# Patient Record
Sex: FEMALE | URBAN - NONMETROPOLITAN AREA
[De-identification: names, ages, dates, MRNs, and addresses within clinical notes are randomized per-mention and may not be internally consistent; named-entity substitution may affect disease eponyms.]

---

## 2019-01-23 ENCOUNTER — OFFICE VISIT (OUTPATIENT)
Dept: URBAN - NONMETROPOLITAN AREA CLINIC 18 | Facility: CLINIC | Age: 84
End: 2019-01-23
Payer: MEDICARE

## 2019-01-23 DIAGNOSIS — H43.391 OTHER VITREOUS OPACITIES, RIGHT EYE: ICD-10-CM

## 2019-01-23 DIAGNOSIS — H25.13 AGE-RELATED NUCLEAR CATARACT, BILATERAL: ICD-10-CM

## 2019-01-23 DIAGNOSIS — H02.834 DERMATOCHALASIS OF LEFT UPPER EYELID: ICD-10-CM

## 2019-01-23 DIAGNOSIS — H02.831 DERMATOCHALASIS OF RIGHT UPPER EYELID: ICD-10-CM

## 2019-01-23 DIAGNOSIS — G43.B0 OPHTHALMOPLEGIC MIGRAINE, NOT INTRACTABLE: Primary | ICD-10-CM

## 2019-01-23 PROCEDURE — 99204 OFFICE O/P NEW MOD 45 MIN: CPT | Performed by: OPTOMETRIST

## 2019-01-23 PROCEDURE — 92133 CPTRZD OPH DX IMG PST SGM ON: CPT | Performed by: OPTOMETRIST

## 2019-01-23 ASSESSMENT — VISUAL ACUITY
OD: 20/40
OS: 20/60

## 2019-01-23 ASSESSMENT — INTRAOCULAR PRESSURE
OD: 16
OS: 16

## 2019-01-23 NOTE — IMPRESSION/PLAN
Impression: Ophthalmoplegic migraine, not intractable: G43. B0. Plan: Discussed in detail with patient. All signs/symptoms and risks of retinal detachment and tears were discussed in detail. Patient instructed to call office immediately if any symptoms noted. 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 Letter to PCP, monitor in 1 month at glaucoma workup.

## 2019-01-23 NOTE — IMPRESSION/PLAN
Impression: Open angle with borderline findings, high risk, bilateral: H40.023. Plan: Discussed status and low tension glaucoma concern. Baseline OCT ON, inf wedge loss OD, vertical thin OS. RTC 1 month for VF 24-2, pachy and IOP check OU.

## 2019-01-23 NOTE — IMPRESSION/PLAN
Impression: Age-related nuclear cataract, bilateral: H25.13. Plan: Reviewed status of cataract with patient and potential effect on visual potential. No treatment at this time. No symptoms. Patient will monitor for any decrease in vision.

## 2019-01-23 NOTE — IMPRESSION/PLAN
Impression: Other vitreous opacities, right eye: H43.391. Plan: All signs/symptoms and risks of retinal detachment and tears were discussed in detail. Patient instructed to call office immediately if any symptoms noted.

## 2019-06-28 ENCOUNTER — OFFICE VISIT (OUTPATIENT)
Dept: URBAN - NONMETROPOLITAN AREA CLINIC 18 | Facility: CLINIC | Age: 84
End: 2019-06-28
Payer: MEDICARE

## 2019-06-28 DIAGNOSIS — H40.023 OPEN ANGLE WITH BORDERLINE FINDINGS, HIGH RISK, BILATERAL: ICD-10-CM

## 2019-06-28 PROCEDURE — 76514 ECHO EXAM OF EYE THICKNESS: CPT | Performed by: OPTOMETRIST

## 2019-06-28 PROCEDURE — 92133 CPTRZD OPH DX IMG PST SGM ON: CPT | Performed by: OPTOMETRIST

## 2019-06-28 PROCEDURE — 99214 OFFICE O/P EST MOD 30 MIN: CPT | Performed by: OPTOMETRIST

## 2019-06-28 PROCEDURE — 92083 EXTENDED VISUAL FIELD XM: CPT | Performed by: OPTOMETRIST

## 2019-06-28 ASSESSMENT — INTRAOCULAR PRESSURE
OD: 17
OS: 17

## 2019-06-28 ASSESSMENT — VISUAL ACUITY
OD: 20/50
OS: 20/70

## 2019-06-28 NOTE — IMPRESSION/PLAN
Impression: Open angle with borderline findings, high risk, bilateral: H40.023. Plan: Discussed status and low tension glaucoma concern. OCT ON today: thin inf OD with questionable progression, vertical thin OS questionable progression. Baseline VF: diffuse loss, greatest loss superior, questionable pattern OD, diffuse loss, greatest nasal. Pachy: thin OU. RTC 3 weeks and discuss glaucoma treatment options with Dr. Aziza Franklin. Consider combined procedure (MIGS) options.

## 2019-06-28 NOTE — IMPRESSION/PLAN
Impression: Age-related nuclear cataract, bilateral: H25.13. Plan: Cataracts account for patient symptoms of decreased visual clarity. Reviewed risk and potential benefits of surgery. Recommend cataract consultation with surgeon. Prognosis outcome guarded secondary to optic nerve status.

## 2019-10-03 ENCOUNTER — OFFICE VISIT (OUTPATIENT)
Dept: URBAN - NONMETROPOLITAN AREA CLINIC 18 | Facility: CLINIC | Age: 84
End: 2019-10-03
Payer: MEDICARE

## 2019-10-03 DIAGNOSIS — H40.1132 PRIMARY OPEN-ANGLE GLAUCOMA, BILATERAL, MODERATE STAGE: ICD-10-CM

## 2019-10-03 DIAGNOSIS — H25.813 COMBINED FORMS OF AGE-RELATED CATARACT, BILATERAL: Primary | ICD-10-CM

## 2019-10-03 PROCEDURE — 92136 OPHTHALMIC BIOMETRY: CPT | Performed by: OPHTHALMOLOGY

## 2019-10-03 PROCEDURE — 99204 OFFICE O/P NEW MOD 45 MIN: CPT | Performed by: OPHTHALMOLOGY

## 2019-10-03 RX ORDER — GATIFLOXACIN 5 MG/ML
0.5 % SOLUTION/ DROPS OPHTHALMIC
Qty: 1 | Refills: 1 | Status: INACTIVE
Start: 2019-10-03 | End: 2020-04-21

## 2019-10-03 RX ORDER — DICLOFENAC SODIUM 1 MG/ML
0.1 % SOLUTION/ DROPS OPHTHALMIC
Qty: 1 | Refills: 1 | Status: INACTIVE
Start: 2019-10-03 | End: 2020-04-21

## 2019-10-03 RX ORDER — LATANOPROST 50 UG/ML
0.005 % SOLUTION OPHTHALMIC
Qty: 1 | Refills: 3 | Status: INACTIVE
Start: 2019-10-03 | End: 2020-04-21

## 2019-10-03 RX ORDER — PREDNISOLONE ACETATE 10 MG/ML
1 % SUSPENSION/ DROPS OPHTHALMIC
Qty: 1 | Refills: 1 | Status: INACTIVE
Start: 2019-10-03 | End: 2020-04-21

## 2019-10-03 ASSESSMENT — INTRAOCULAR PRESSURE
OD: 17
OS: 17

## 2019-10-03 ASSESSMENT — VISUAL ACUITY
OS: 20/70
OD: 20/50

## 2019-10-03 ASSESSMENT — KERATOMETRY
OS: 46.38
OD: 46.75

## 2019-10-03 NOTE — IMPRESSION/PLAN
Impression: Primary open-angle glaucoma, bilateral, moderate stage: T29.1900. Plan: OCT and Pach reviewed. Rec. starting Latanoprost QHS OU.  VF and IOP check to determine need to continue drops.

## 2019-10-03 NOTE — IMPRESSION/PLAN
Impression: Combined forms of age-related cataract, bilateral: H25.813. Plan: Cataract accounts for pt complaints. Pt desires sx. Schedule CE/IOL with MIGS OS only, may need OD in the future. Risk/Benefits/Alternatives discussed with patient. Rec. mono-focal. RL2. Target distance. Generic drops. Rec. iStent Inject vs. Goniotomy with Mayela Adrian for better IOP control and to prevent progression. Discussed possible need additional drops or surgery in the future. Rec. intra-ocular Moxifloxacin to decrease the risk of endophthalmitis.

## 2019-10-24 ENCOUNTER — POST-OPERATIVE VISIT (OUTPATIENT)
Dept: URBAN - METROPOLITAN AREA CLINIC 88 | Facility: CLINIC | Age: 84
End: 2019-10-24

## 2019-10-24 ENCOUNTER — Encounter (OUTPATIENT)
Dept: URBAN - METROPOLITAN AREA EXTERNAL CLINIC 48 | Facility: EXTERNAL CLINIC | Age: 84
End: 2019-10-24
Payer: MEDICARE

## 2019-10-24 PROCEDURE — 66984 XCAPSL CTRC RMVL W/O ECP: CPT | Performed by: OPHTHALMOLOGY

## 2019-10-24 PROCEDURE — 99024 POSTOP FOLLOW-UP VISIT: CPT | Performed by: OPTOMETRIST

## 2019-10-24 ASSESSMENT — INTRAOCULAR PRESSURE
OS: 16
OS: 16

## 2019-10-30 ENCOUNTER — POST-OPERATIVE VISIT (OUTPATIENT)
Dept: URBAN - NONMETROPOLITAN AREA CLINIC 18 | Facility: CLINIC | Age: 84
End: 2019-10-30

## 2019-10-30 DIAGNOSIS — Z09 ENCNTR FOR F/U EXAM AFT TRTMT FOR COND OTH THAN MALIG NEOPLM: Primary | ICD-10-CM

## 2019-10-30 PROCEDURE — 99024 POSTOP FOLLOW-UP VISIT: CPT | Performed by: OPTOMETRIST

## 2019-10-30 ASSESSMENT — VISUAL ACUITY: OS: 20/20-2

## 2019-10-30 ASSESSMENT — INTRAOCULAR PRESSURE
OS: 19
OS: 18
OD: 19

## 2019-11-20 ENCOUNTER — POST-OPERATIVE VISIT (OUTPATIENT)
Dept: URBAN - NONMETROPOLITAN AREA CLINIC 18 | Facility: CLINIC | Age: 84
End: 2019-11-20

## 2019-11-20 ASSESSMENT — INTRAOCULAR PRESSURE
OD: 14
OS: 14

## 2019-11-20 ASSESSMENT — VISUAL ACUITY
OD: 20/25+
OS: 20/20

## 2020-02-05 ENCOUNTER — OFFICE VISIT (OUTPATIENT)
Dept: URBAN - NONMETROPOLITAN AREA CLINIC 18 | Facility: CLINIC | Age: 85
End: 2020-02-05
Payer: MEDICARE

## 2020-02-05 DIAGNOSIS — H25.811 COMBINED FORMS OF AGE-RELATED CATARACT, RIGHT EYE: ICD-10-CM

## 2020-02-05 PROCEDURE — 99214 OFFICE O/P EST MOD 30 MIN: CPT | Performed by: OPTOMETRIST

## 2020-02-05 PROCEDURE — 92133 CPTRZD OPH DX IMG PST SGM ON: CPT | Performed by: OPTOMETRIST

## 2020-02-05 ASSESSMENT — VISUAL ACUITY
OD: 20/40
OS: 20/20

## 2020-02-05 ASSESSMENT — INTRAOCULAR PRESSURE
OS: 14
OD: 14

## 2020-02-05 NOTE — IMPRESSION/PLAN
Impression: Punctate keratitis, bilateral: H16.143. Plan: Discussed condition with patient in detail. Start Cequa BID OU, sample provided. Recommend treatment with Preservative Free Artificial tears QID (Systane or Refresh Brand recommended). RTC if symptoms continue.

## 2020-02-05 NOTE — IMPRESSION/PLAN
Impression: Primary open-angle glaucoma, bilateral, moderate stage: U92.5974. Plan: Discussed status and low tension glaucoma concern. OCT ON today: vertical thin stable OU. Prior VF: diffuse loss, greatest loss superior, questionable pattern OD, diffuse loss, greatest nasal. Pachy: thin OU. Continue Latanoprost QHS OU. Starting IOP 19, target set at 14, met today. RTC 1 month for IOP check and VF 24-2.

## 2020-02-05 NOTE — IMPRESSION/PLAN
Impression: Combined forms of age-related cataract, right eye: H25.811. Plan: Reviewed status of cataract with patient and potential effect on vision. Patient not experiencing a decrease in quality of life from cataracts. No treatment at this time. Patient will monitor for any decrease in vision and notify clinic if new symptoms experienced.

## 2020-03-06 ENCOUNTER — OFFICE VISIT (OUTPATIENT)
Dept: URBAN - NONMETROPOLITAN AREA CLINIC 18 | Facility: CLINIC | Age: 85
End: 2020-03-06
Payer: MEDICARE

## 2020-03-06 DIAGNOSIS — H52.4 PRESBYOPIA: ICD-10-CM

## 2020-03-06 PROCEDURE — 99213 OFFICE O/P EST LOW 20 MIN: CPT | Performed by: OPTOMETRIST

## 2020-03-06 PROCEDURE — 92083 EXTENDED VISUAL FIELD XM: CPT | Performed by: OPTOMETRIST

## 2020-03-06 ASSESSMENT — INTRAOCULAR PRESSURE
OS: 10
OD: 12

## 2020-03-06 ASSESSMENT — VISUAL ACUITY
OD: 20/30
OS: 20/25

## 2020-03-06 NOTE — IMPRESSION/PLAN
Impression: Primary open-angle glaucoma, bilateral, severe stage: E10.4159. Plan: Discussed status and low tension glaucoma concern. VF 24-2 today, sup arcuate lid? inf nasal step OD, castano arcuate paracentral inf nasal step OS. Last OCT ON, vertical thin stable OU. Pachy: thin OU. Continue Latanoprost QHS OU. Starting IOP 19, target set at 14, met today. Tolerating drops well and IOP controlled. RTC 3 months for IOP check.  Repeat VF in 6 months

## 2020-06-09 ENCOUNTER — OFFICE VISIT (OUTPATIENT)
Dept: URBAN - NONMETROPOLITAN AREA CLINIC 18 | Facility: CLINIC | Age: 85
End: 2020-06-09
Payer: MEDICARE

## 2020-06-09 DIAGNOSIS — H40.1133 PRIMARY OPEN-ANGLE GLAUCOMA, BILATERAL, SEVERE STAGE: Primary | ICD-10-CM

## 2020-06-09 PROCEDURE — 99213 OFFICE O/P EST LOW 20 MIN: CPT | Performed by: OPTOMETRIST

## 2020-06-09 ASSESSMENT — INTRAOCULAR PRESSURE
OD: 11
OS: 11

## 2020-06-09 NOTE — IMPRESSION/PLAN
Impression: Primary open-angle glaucoma, bilateral, severe stage: G30.0768. Plan: Discussed status and low tension glaucoma concern. Continue Latanoprost QHS OU. Starting IOP 19, target set at 14, met today. Tolerating drops well and IOP controlled.   RTC 3 months for IOP check and VF

## 2020-09-09 ENCOUNTER — OFFICE VISIT (OUTPATIENT)
Dept: URBAN - NONMETROPOLITAN AREA CLINIC 18 | Facility: CLINIC | Age: 85
End: 2020-09-09
Payer: MEDICARE

## 2020-09-09 DIAGNOSIS — H02.052 TRICHIASIS WITHOUT ENTROPION RIGHT LOWER EYELID: ICD-10-CM

## 2020-09-09 DIAGNOSIS — H01.8 OTHER SPECIFIED INFLAMMATIONS OF EYELID: ICD-10-CM

## 2020-09-09 DIAGNOSIS — Z96.1 PRESENCE OF PSEUDOPHAKIA: ICD-10-CM

## 2020-09-09 DIAGNOSIS — H16.143 PUNCTATE KERATITIS, BILATERAL: ICD-10-CM

## 2020-09-09 PROCEDURE — 92083 EXTENDED VISUAL FIELD XM: CPT | Performed by: OPTOMETRIST

## 2020-09-09 PROCEDURE — 99213 OFFICE O/P EST LOW 20 MIN: CPT | Performed by: OPTOMETRIST

## 2020-09-09 ASSESSMENT — INTRAOCULAR PRESSURE
OD: 10
OS: 11

## 2020-09-09 NOTE — IMPRESSION/PLAN
Impression: Punctate keratitis, bilateral: H16.143. Plan: Discussed status. ATs for comfort. Pataday Qam to reduce itching symptoms. RTC if any additional concerns experienced.

## 2020-09-09 NOTE — ASSESSMENT/PLAN
Impression: Visual Field - OD: Fair-scattered loss; OS: Poor-nasal loss, improved Plan: Monitor. IOP controlled and no progression. Poor testing ability.

## 2021-05-26 ENCOUNTER — OFFICE VISIT (OUTPATIENT)
Dept: URBAN - NONMETROPOLITAN AREA CLINIC 18 | Facility: CLINIC | Age: 86
End: 2021-05-26
Payer: MEDICARE

## 2021-05-26 DIAGNOSIS — H04.123 DRY EYE SYNDROME OF BILATERAL LACRIMAL GLANDS: ICD-10-CM

## 2021-05-26 PROCEDURE — 99214 OFFICE O/P EST MOD 30 MIN: CPT | Performed by: OPTOMETRIST

## 2021-05-26 PROCEDURE — 92133 CPTRZD OPH DX IMG PST SGM ON: CPT | Performed by: OPTOMETRIST

## 2021-05-26 ASSESSMENT — INTRAOCULAR PRESSURE
OD: 14
OS: 11
OD: 15
OS: 12

## 2021-05-26 NOTE — IMPRESSION/PLAN
Impression: Primary open-angle glaucoma, bilateral, severe stage: I94.4210. Plan: Discussed status and low tension glaucoma concern. Continue Latanoprost QHS OU. Starting IOP 19, target set at 14, met today. Tolerating drops well and IOP controlled. Questionable progression OS. Additional treatment with secondary drop offered with risk reviewed, patient deferred at this time. Referral 2 months for Cataract/MIGS evaluation with Dr. Elie Moseley in [facility].

## 2021-05-26 NOTE — IMPRESSION/PLAN
Impression: Age-related nuclear cataract, right eye: H25.11. Plan: Cataracts account for patient symptoms of decreased visual clarity. Reviewed risk and potential benefits of surgery. Recommend cataract consultation with surgeon.

## 2021-09-02 ENCOUNTER — OFFICE VISIT (OUTPATIENT)
Dept: URBAN - METROPOLITAN AREA CLINIC 88 | Facility: CLINIC | Age: 86
End: 2021-09-02
Payer: MEDICARE

## 2021-09-02 PROCEDURE — 99213 OFFICE O/P EST LOW 20 MIN: CPT | Performed by: OPHTHALMOLOGY

## 2021-09-02 ASSESSMENT — INTRAOCULAR PRESSURE
OD: 13
OS: 12

## 2021-09-02 ASSESSMENT — KERATOMETRY
OS: 46.00
OD: 46.63

## 2021-09-02 ASSESSMENT — VISUAL ACUITY
OD: 20/40
OS: 20/25

## 2021-09-02 NOTE — IMPRESSION/PLAN
Impression: Combined forms of age-related cataract, right eye: H25.811. Plan: Cataracts account for the patient's complaints. Discussed all risks, benefits, procedures and recovery. Patient understands changing glasses will not improve vision. Patient desires to have surgery, recommend phacoemulsification with intraocular lens. Surgical risks and benefits were discussed, explained and to patient. RL2 Schedule Cataract sx OD. Final post operative refractive decision will be made by Operative Surgeon. Pred-Moxi-Nepafenac/generic drops/dexycu Post op care in [place]

## 2021-09-02 NOTE — IMPRESSION/PLAN
Impression: Primary open-angle glaucoma, bilateral, severe stage: V07.9362. Plan: Intraocular pressure well controlled, tolerating medications.  Continue with Latanoprost QHS OU

## 2021-09-15 ENCOUNTER — TESTING ONLY (OUTPATIENT)
Dept: URBAN - NONMETROPOLITAN AREA CLINIC 18 | Facility: CLINIC | Age: 86
End: 2021-09-15
Payer: MEDICARE

## 2021-09-15 DIAGNOSIS — H25.11 AGE-RELATED NUCLEAR CATARACT, RIGHT EYE: Primary | ICD-10-CM

## 2021-09-15 PROCEDURE — 76519 ECHO EXAM OF EYE: CPT | Performed by: OPHTHALMOLOGY

## 2021-10-08 ENCOUNTER — POST-OPERATIVE VISIT (OUTPATIENT)
Dept: URBAN - METROPOLITAN AREA CLINIC 89 | Facility: CLINIC | Age: 86
End: 2021-10-08
Payer: MEDICARE

## 2021-10-08 ENCOUNTER — SURGERY (OUTPATIENT)
Dept: URBAN - METROPOLITAN AREA SURGERY 56 | Facility: SURGERY | Age: 86
End: 2021-10-08
Payer: MEDICARE

## 2021-10-08 PROCEDURE — G8918 PT W/O PREOP ORDER IV AB PRO: HCPCS | Performed by: CLINIC/CENTER

## 2021-10-08 PROCEDURE — 99024 POSTOP FOLLOW-UP VISIT: CPT

## 2021-10-08 PROCEDURE — G8907 PT DOC NO EVENTS ON DISCHARG: HCPCS | Performed by: CLINIC/CENTER

## 2021-10-08 ASSESSMENT — INTRAOCULAR PRESSURE
OD: 6
OS: 9
OS: 9
OD: 6

## 2021-10-08 NOTE — IMPRESSION/PLAN
Impression:  Presence of intraocular lens  Z96.1. Plan: S/P Cataract extraction right eye with placement of intraocular Dexycu
and moxifloxacin intracamerally - post-op day #0 - Advised patient that likely will
see floaters for 1-2 weeks. Advised no heavy lifting or strenuous activity for at
least one week and no swimming for at least three weeks. Use eye shield all day
today and then at night for one week. Patient advised to call immediately for any
problems including, but not limited to, pain, redness, increase in floaters, flashing
lights, changes in peripheral vision, decreased vision, and/or any other problems
or concerns. Patient stated an understanding of all the instructions. Follow-up in
approximately one week / prn.

## 2021-10-15 ENCOUNTER — POST-OPERATIVE VISIT (OUTPATIENT)
Dept: URBAN - NONMETROPOLITAN AREA CLINIC 18 | Facility: CLINIC | Age: 86
End: 2021-10-15
Payer: MEDICARE

## 2021-10-15 PROCEDURE — 99024 POSTOP FOLLOW-UP VISIT: CPT | Performed by: OPTOMETRIST

## 2021-10-15 ASSESSMENT — INTRAOCULAR PRESSURE
OS: 14
OD: 14

## 2021-10-15 ASSESSMENT — VISUAL ACUITY
OS: 20/30-2
OD: 20/40

## 2021-10-15 NOTE — IMPRESSION/PLAN
Impression: S/P Cataract Extraction by phacoemulsification with IOL placement OD - 7 Days. Presence of intraocular lens  Z96.1. Post operative instructions reviewed -
OCT macula today, AMD, -CME, -SRNM OU Plan: RTC if pain, redness or changes in vision experienced. --Advised patient to use artificial tears for comfort.

## 2021-11-03 ENCOUNTER — POST-OPERATIVE VISIT (OUTPATIENT)
Dept: URBAN - NONMETROPOLITAN AREA CLINIC 18 | Facility: CLINIC | Age: 86
End: 2021-11-03
Payer: MEDICARE

## 2021-11-03 PROCEDURE — 99024 POSTOP FOLLOW-UP VISIT: CPT | Performed by: OPTOMETRIST

## 2021-11-03 ASSESSMENT — VISUAL ACUITY
OS: 20/25
OD: 20/40

## 2021-11-03 ASSESSMENT — INTRAOCULAR PRESSURE
OD: 11
OS: 11

## 2021-11-03 NOTE — IMPRESSION/PLAN
Impression: S/P Cataract Extraction by phacoemulsification with IOL placement OD - 26 Days. Presence of intraocular lens  Z96.1. Post operative instructions reviewed - Plan: RTC if pain, redness or changes in vision experienced. --Advised patient to use artificial tears for comfort. Continue Latanoprost QHS OU.

## 2022-02-04 ENCOUNTER — OFFICE VISIT (OUTPATIENT)
Dept: URBAN - NONMETROPOLITAN AREA CLINIC 18 | Facility: CLINIC | Age: 87
End: 2022-02-04
Payer: MEDICARE

## 2022-02-04 DIAGNOSIS — H43.813 VITREOUS DEGENERATION, BILATERAL: ICD-10-CM

## 2022-02-04 DIAGNOSIS — H26.491 OTHER SECONDARY CATARACT, RIGHT EYE: ICD-10-CM

## 2022-02-04 DIAGNOSIS — H10.413 CONJUNCTIVITIS - ALLERGIC: ICD-10-CM

## 2022-02-04 PROCEDURE — 99213 OFFICE O/P EST LOW 20 MIN: CPT | Performed by: OPTOMETRIST

## 2022-02-04 PROCEDURE — 92133 CPTRZD OPH DX IMG PST SGM ON: CPT | Performed by: OPTOMETRIST

## 2022-02-04 ASSESSMENT — INTRAOCULAR PRESSURE
OD: 11
OS: 11

## 2022-02-04 NOTE — IMPRESSION/PLAN
Impression: Primary open-angle glaucoma, bilateral, severe stage: Z75.2782. Plan: Discussed status and low tension glaucoma concern. OCT ON today, stable vertical thin OU. Continue Latanoprost QHS OU. Starting IOP 19, target set at 14, met today. Tolerating drops well and IOP controlled. Will monitor in 4 months with VF 24-2 and IOP check.

## 2022-06-10 ENCOUNTER — OFFICE VISIT (OUTPATIENT)
Dept: URBAN - NONMETROPOLITAN AREA CLINIC 18 | Facility: CLINIC | Age: 87
End: 2022-06-10
Payer: MEDICARE

## 2022-06-10 DIAGNOSIS — Z96.1 PRESENCE OF PSEUDOPHAKIA: ICD-10-CM

## 2022-06-10 DIAGNOSIS — H26.491 OTHER SECONDARY CATARACT, RIGHT EYE: ICD-10-CM

## 2022-06-10 DIAGNOSIS — H40.1133 PRIMARY OPEN-ANGLE GLAUCOMA, BILATERAL, SEVERE STAGE: Primary | ICD-10-CM

## 2022-06-10 DIAGNOSIS — H10.413 CONJUNCTIVITIS - ALLERGIC: ICD-10-CM

## 2022-06-10 PROCEDURE — 99214 OFFICE O/P EST MOD 30 MIN: CPT | Performed by: OPTOMETRIST

## 2022-06-10 PROCEDURE — 92083 EXTENDED VISUAL FIELD XM: CPT | Performed by: OPTOMETRIST

## 2022-06-10 RX ORDER — BRIMONIDINE TARTRATE 2 MG/ML
0.2 % SOLUTION/ DROPS OPHTHALMIC
Qty: 10 | Refills: 1 | Status: ACTIVE
Start: 2022-06-10

## 2022-06-10 ASSESSMENT — INTRAOCULAR PRESSURE
OD: 12
OS: 13

## 2022-06-10 NOTE — IMPRESSION/PLAN
Impression: Conjunctivitis - Allergic: H10.413. Plan: Patient educated that symptoms of ocular irritation are likely caused by allergies. Recommend pt continue igtt Pataday QD OU and cool compresses to improve symptoms. Patient understands that medication will not cure allergies and is useful in reducing symptoms. RTC if symptoms do not improve or if new symptoms are experienced.

## 2022-06-10 NOTE — IMPRESSION/PLAN
Impression: Primary open-angle glaucoma, bilateral, severe stage: J86.1536. Plan: Discussed status and low tension glaucoma concern. VF 24-2 today, sup arcuate with paracentral loss OD, sup cecocentral loss inf nasal loss OS, progression OD, questionable progression OS. H/O OCT ON, stable vertical thin OU. Continue Latanoprost QHS OU and add Brimonidine BID OU, rx sent to pharmacy. Starting IOP 19, target adjusted to 12. Tolerating drops well and IOP controlled. Will monitor in 6 weeks for IOP check.

## 2022-07-22 ENCOUNTER — OFFICE VISIT (OUTPATIENT)
Dept: URBAN - NONMETROPOLITAN AREA CLINIC 18 | Facility: CLINIC | Age: 87
End: 2022-07-22
Payer: MEDICARE

## 2022-07-22 DIAGNOSIS — H40.1133 PRIMARY OPEN-ANGLE GLAUCOMA, BILATERAL, SEVERE STAGE: Primary | ICD-10-CM

## 2022-07-22 DIAGNOSIS — H10.413 CONJUNCTIVITIS - ALLERGIC: ICD-10-CM

## 2022-07-22 DIAGNOSIS — H26.491 OTHER SECONDARY CATARACT, RIGHT EYE: ICD-10-CM

## 2022-07-22 DIAGNOSIS — Z96.1 PRESENCE OF PSEUDOPHAKIA: ICD-10-CM

## 2022-07-22 PROCEDURE — 99213 OFFICE O/P EST LOW 20 MIN: CPT | Performed by: OPTOMETRIST

## 2022-07-22 ASSESSMENT — INTRAOCULAR PRESSURE
OS: 11
OD: 11

## 2022-07-22 NOTE — IMPRESSION/PLAN
Impression: Primary open-angle glaucoma, bilateral, severe stage: U40.4612. Plan: Discussed status and low tension glaucoma concern. H/O VF 24-2, sup arcuate with paracentral loss OD, sup cecocentral loss inf nasal loss OS, progression OD, questionable progression OS. H/O OCT ON, stable vertical thin OU. D/C Latanoprost QHS OU due to poor tolerance. Continue Brimonidine BID OU. Starting IOP 19, target adjusted to 12. Tolerates Brimonidine drops well and IOP controlled. Will monitor in 6 weeks for IOP check.

## 2022-09-02 ENCOUNTER — OFFICE VISIT (OUTPATIENT)
Dept: URBAN - NONMETROPOLITAN AREA CLINIC 18 | Facility: CLINIC | Age: 87
End: 2022-09-02
Payer: MEDICARE

## 2022-09-02 DIAGNOSIS — H26.491 OTHER SECONDARY CATARACT, RIGHT EYE: ICD-10-CM

## 2022-09-02 DIAGNOSIS — Z96.1 PRESENCE OF PSEUDOPHAKIA: ICD-10-CM

## 2022-09-02 DIAGNOSIS — H10.413 CONJUNCTIVITIS - ALLERGIC: ICD-10-CM

## 2022-09-02 DIAGNOSIS — H40.1133 PRIMARY OPEN-ANGLE GLAUCOMA, BILATERAL, SEVERE STAGE: Primary | ICD-10-CM

## 2022-09-02 PROCEDURE — 99213 OFFICE O/P EST LOW 20 MIN: CPT | Performed by: OPTOMETRIST

## 2022-09-02 RX ORDER — BRIMONIDINE TARTRATE 2 MG/ML
0.2 % SOLUTION/ DROPS OPHTHALMIC
Qty: 10 | Refills: 5 | Status: ACTIVE
Start: 2022-09-02

## 2022-09-02 RX ORDER — OLOPATADINE HYDROCHLORIDE 2 MG/ML
0.2 % SOLUTION OPHTHALMIC
Qty: 7.5 | Refills: 2 | Status: ACTIVE
Start: 2022-09-02

## 2022-09-02 ASSESSMENT — INTRAOCULAR PRESSURE
OS: 12
OD: 11

## 2022-09-02 NOTE — IMPRESSION/PLAN
Impression: Primary open-angle glaucoma, bilateral, severe stage: V19.5942. Plan: Discussed status and low tension glaucoma concern. H/O VF 24-2, sup arcuate with paracentral loss OD, sup cecocentral loss inf nasal loss OS, progression OD, questionable progression OS. H/O OCT ON, stable vertical thin OU. D/C Latanoprost QHS OU due to poor tolerance. Continue Brimonidine BID OU. Starting IOP 19, target adjusted to 12. Tolerates Brimonidine drops well and IOP controlled. Will monitor in 4 months for dilated exam and OCT ON.

## 2023-01-05 ENCOUNTER — OFFICE VISIT (OUTPATIENT)
Dept: URBAN - NONMETROPOLITAN AREA CLINIC 18 | Facility: CLINIC | Age: 88
End: 2023-01-05
Payer: MEDICARE

## 2023-01-05 DIAGNOSIS — H26.491 OTHER SECONDARY CATARACT, RIGHT EYE: ICD-10-CM

## 2023-01-05 DIAGNOSIS — Z96.1 PRESENCE OF PSEUDOPHAKIA: ICD-10-CM

## 2023-01-05 DIAGNOSIS — H40.1133 PRIMARY OPEN-ANGLE GLAUCOMA, BILATERAL, SEVERE STAGE: Primary | ICD-10-CM

## 2023-01-05 PROCEDURE — 99213 OFFICE O/P EST LOW 20 MIN: CPT | Performed by: OPTOMETRIST

## 2023-01-05 PROCEDURE — 92133 CPTRZD OPH DX IMG PST SGM ON: CPT | Performed by: OPTOMETRIST

## 2023-01-05 ASSESSMENT — INTRAOCULAR PRESSURE
OS: 10
OD: 9

## 2023-01-05 NOTE — IMPRESSION/PLAN
Impression: Primary open-angle glaucoma, bilateral, severe stage: W63.0204. Plan: Discussed status and low tension glaucoma concern. H/O VF 24-2, sup arcuate with paracentral loss OD, sup cecocentral loss inf nasal loss OS, progression OD, questionable progression OS. OCT ON, stable vertical thin OU, faint drance OD. D/C Latanoprost QHS OU due to poor tolerance. Continue Brimonidine BID OU. Starting IOP 19, target adjusted to 12. Tolerates Brimonidine drops well and IOP controlled. Will monitor in 4 months for VF and IOP check.

## 2023-03-07 ENCOUNTER — OFFICE VISIT (OUTPATIENT)
Dept: URBAN - NONMETROPOLITAN AREA CLINIC 18 | Facility: CLINIC | Age: 88
End: 2023-03-07
Payer: MEDICARE

## 2023-03-07 DIAGNOSIS — H40.1133 PRIMARY OPEN-ANGLE GLAUCOMA, BILATERAL, SEVERE STAGE: ICD-10-CM

## 2023-03-07 DIAGNOSIS — H01.8 OTHER SPECIFIED INFLAMMATIONS OF EYELID: Primary | ICD-10-CM

## 2023-03-07 DIAGNOSIS — Z96.1 PRESENCE OF PSEUDOPHAKIA: ICD-10-CM

## 2023-03-07 PROCEDURE — 99213 OFFICE O/P EST LOW 20 MIN: CPT | Performed by: OPTOMETRIST

## 2023-03-07 RX ORDER — NEOMYCIN AND POLYMYXIN B SULFATES AND BACITRACIN ZINC 400; 3.5; 1 [USP'U]/G; MG/G; [USP'U]/G
OINTMENT OPHTHALMIC
Qty: 3.5 | Refills: 0 | Status: ACTIVE
Start: 2023-03-07

## 2023-03-07 ASSESSMENT — INTRAOCULAR PRESSURE
OD: 18
OS: 18

## 2023-03-07 NOTE — IMPRESSION/PLAN
Impression: Other specified inflammations of eyelid: H01.8. Plan: Discussed cause of eyelid and ocular irritation with patient in detail. Lid hygiene discussed to reduce symptoms. Will rx Shawn/bacitracin/poly ming QHS x 10 days. Patient understands this is a chronic condition that will require daily lid cleaning,. RTC if any new symptoms are experienced.
Impression: Presence of pseudophakia: Z96.1. Plan: Discussed status in detail with patient. Will continue to observe.
Impression: Primary open-angle glaucoma, bilateral, severe stage: L32.1407. Plan: Discussed status and low tension glaucoma concern. H/O VF 24-2, sup arcuate with paracentral loss OD, sup cecocentral loss inf nasal loss OS, progression OD, questionable progression OS. H/O OCT ON, stable vertical thin OU, faint drance OD. D/C Latanoprost QHS OU due to poor tolerance. Continue Brimonidine BID OU. Starting IOP 19, target adjusted to 12, not met today. Tolerates Brimonidine drops well and IOP controlled. Will monitor in 3 months for VF and IOP check.
Lenin Olivo (MD), Diagnostic Radiology  [address]  [address] [place] [address]  Phone: [phone]  Fax: [phone]

## 2024-06-10 ENCOUNTER — OFFICE VISIT (OUTPATIENT)
Dept: URBAN - NONMETROPOLITAN AREA CLINIC 18 | Facility: CLINIC | Age: 89
End: 2024-06-10
Payer: MEDICARE

## 2024-06-10 DIAGNOSIS — H40.1133 PRIMARY OPEN-ANGLE GLAUCOMA, BILATERAL, SEVERE STAGE: Primary | ICD-10-CM

## 2024-06-10 DIAGNOSIS — H02.834 DERMATOCHALASIS OF LEFT UPPER EYELID: ICD-10-CM

## 2024-06-10 DIAGNOSIS — H04.123 DRY EYE SYNDROME OF BILATERAL LACRIMAL GLANDS: ICD-10-CM

## 2024-06-10 DIAGNOSIS — Z96.1 PRESENCE OF PSEUDOPHAKIA: ICD-10-CM

## 2024-06-10 DIAGNOSIS — H02.831 DERMATOCHALASIS OF RIGHT UPPER EYELID: ICD-10-CM

## 2024-06-10 PROCEDURE — 99214 OFFICE O/P EST MOD 30 MIN: CPT | Performed by: OPTOMETRIST

## 2024-06-10 RX ORDER — DORZOLAMIDE HYDROCHLORIDE AND TIMOLOL MALEATE 20; 5 MG/ML; MG/ML
SOLUTION/ DROPS OPHTHALMIC
Qty: 5 | Refills: 1 | Status: ACTIVE
Start: 2024-06-10

## 2024-06-10 ASSESSMENT — INTRAOCULAR PRESSURE
OS: 16
OD: 11
OD: 16
OS: 14